# Patient Record
Sex: MALE
[De-identification: names, ages, dates, MRNs, and addresses within clinical notes are randomized per-mention and may not be internally consistent; named-entity substitution may affect disease eponyms.]

---

## 2018-04-06 ENCOUNTER — HOSPITAL ENCOUNTER (EMERGENCY)
Dept: HOSPITAL 68 - ERH | Age: 38
End: 2018-04-06
Payer: COMMERCIAL

## 2018-04-06 VITALS — DIASTOLIC BLOOD PRESSURE: 87 MMHG | SYSTOLIC BLOOD PRESSURE: 132 MMHG

## 2018-04-06 VITALS — WEIGHT: 180 LBS | BODY MASS INDEX: 29.99 KG/M2 | HEIGHT: 65 IN

## 2018-04-06 DIAGNOSIS — K04.7: Primary | ICD-10-CM

## 2018-04-06 NOTE — ED THROAT/DENTAL COMPLAINT
History of Present Illness
 
General
Chief Complaint: General Adult
Stated Complaint: "BUMP INSIDE TOP LIP/ROOF OF MY MOUTH"
Source: patient
Exam Limitations: no limitations
 
Vital Signs & Intake/Output
Vital Signs & Intake/Output
 Vital Signs
 
 
Date Time Temp Pulse Resp B/P B/P Pulse O2 O2 Flow FiO2
 
     Mean Ox Delivery Rate 
 
04/06 0747 96.6 80 16 132/87  98 Room Air  
 
 
 
Allergies
Coded Allergies:
No Known Allergies (04/06/18)
 
Reconcile Medications
Amoxicillin/Clavulanate Potass (Amox-Clav 875-125 MG Tablet) 875 MG-125 MG 
TABLET   1 TAB PO BID ANTIBIOTIC, INFECTION  (Reported)
Hydrocodone/Acetaminophen (Norco 5-325 Tablet) 5 MG-325 MG TABLET   1-2 TAB PO 
Q4-6 PRN PRN PAIN
Lactobacillus Acidophilus (Probiotic) 10 BILLION CELL CAPSULE   1 CAP PO DAILY 
GI  (Reported)
Lidocaine HCl (Lidocaine HCl Viscous) 2 % SOLUTION   15 ML PO 4 TIMES/DAY PRN 
DENTAL PAIN
 
Triage Note:
PT SEEN AT WALK IN CLINIC YESTERDAY AND PRESCRIBED
 AMOXCILLIN FOR ABCESS TO ROOF OF MOUTH AND UPPER
 LIP. TOLD TO COME TO ED IF PAIN WORSENED.
 ARRIVES TO ED FOR INCREASED PAIN/SWELLING TO
 ABCESS ON UPPER LIP. TOOK DOSE OF AMOXICILLIN THIS
 AM. AWAKE/ALERT WITH EASY WOB. DENIES DRAINAGE
 FROM EITHER ABCESS.
Triage Nurses Notes Reviewed? yes
Onset: Gradual
Duration: day(s): (2)
Timing: no prior history
Injury Environment: home
Severity: moderate
Severity Numbers: 6
Modifying Factors:
Worsens With: movement. 
HPI:
Patient is a 38-year-old male with no past medical history presenting to the 
emergency department with chief complaint of upper gum and upper lip since 
yesterday.  He was seen at a walk-in clinic and diagnosed with a gum abscess.  
Who started on Augmentin yesterday evening, patient has taken a total of 3 doses
including this morning.  Reports that the pain is not controlled with Motrin.  
He does report that the abscess has increased in size since yesterday as well.  
No fevers or chills.  Otherwise feeling fine.  No history of diabetes.  Denies 
any trauma.
 
Past History
 
Travel History
Traveled to [place] past 21 day No
 
Medical History
Any Pertinent Medical History? see below for history
Neurological: NONE
EENT: NONE
Cardiovascular: NONE
Respiratory: NONE
Gastrointestinal: NONE
Hepatic: NONE
Renal: NONE
Musculoskeletal: NONE
Psychiatric: NONE
Endocrine: NONE
 
Surgical History
Surgical History: non-contributory
 
Psychosocial History
What is your primary language English
Tobacco Use: Current Daily Use
Daily Tobacco Use Amount/Type: => 5 Cigarettes daily
Illicit Drug Use: denies illicit drug use
 
Family History
Hx Contributory? No
 
Review of Systems
 
Review of Systems
Constitutional:
Reports: no symptoms. 
Comments
Review of systems: See HPI, All other systems negative.
Constitutional, no chills fever or weight loss
HEENT: No visual changes no sore throat no congestion
Cardiovascular: No chest pain ,palpitation , orthopnea or ankle swelling
Skin, no jaundice 
Respiratory: No dyspnea cough sputum or hemoptysis
GI: No nausea no vomiting
: No dysuria No hematuria
Muscle skeletal: no back pain, no neck pain,
Neurologic: No numbness no confusion, no headaches
Psych: No stress anxiety 
Immunology: No splenectomy or history of AIDS
 
Physical Exam
 
Physical Exam
General Appearance: well developed/nourished, no apparent distress, alert, awake
, comfortable
Mouth/Throat: MILD ERYTHEMA AND EDEMA WITH FLUCTUANCE NOTED TO UPPER GUMLINE, 
CENTRALLY LOCATED.
Comments:
Well-developed well-nourished person in no acute distress
HEENT:  Pupils equally round and reactive to light and accommodation. Nose is 
atraumatic.  Mild edema noted in the upper gumline, centrally located with 
fluctuance approximately 1 cm in size, tender.  No discharge noted.  Upper lip 
is mildly edematous, no abscess identified on the lip.  Clearing secretions 
without difficulty.  No airway edema noted.
Neck: Normal inspection
Respiratory: No respiratory distress.
Extremity: No edema
Neuro: Alert oriented x3, motor sensory normal
Skin: No appreciable rash on exposed skin, skin is warm and dry.
Psych: Mood and affect is normal, memory and judgment is normal.
 
Core Measures
ACS in differential dx? No
Sepsis Present: No
Sepsis Focused Exam Completed? No
 
Progress
Differential Diagnosis: , ABSCESS, PERITONSILLAR ABSCESS, CELLULITIS, DENTAL 
INFECTION
Plan of Care:
 Current Medications
 
 
  Sig/Sharonda Start time  Last
 
Medication Dose  Stop Time Status Admin
 
Lidocaine 20 ML ONCE ONE 04/06 0815 UNVr 04/06
 
(Lidocaine 1%)   04/06 0816 0817
 
 
 
 
Departure
 
Departure
Time of Disposition: 0819
Disposition: HOME OR SELF CARE
Condition: Stable
Clinical Impression
Primary Impression: Dental abscess
Referrals:
Taylor Fatima MD (PCP/Family)
 
Additional Instructions:
Follow-up with your oral surgeon, call today to make an appointment for the next
3-5 days.  Continue Augmentin as per dosage tried.  He can take ibuprofen as 
directed over-the-counter.  For severe pain take Norco as prescribed.  Return 
for worsening symptoms or concerns.  It is also advised that he follow-up with 
the Hospital the oral surgeon either to is associated with for any worsening 
symptoms.
Departure Forms:
Customer Survey
General Discharge Information
Prescriptions:
Current Visit Scripts
Lidocaine HCl (Lidocaine HCl Viscous) 15 ML PO 4 TIMES/DAY PRN DENTAL PAIN 
     #100 ML 
 
Hydrocodone/Acetaminophen (Norco 5-325 Tablet) 1-2 TAB PO Q4-6 PRN PRN PAIN 
     #10 TAB 
 
 
 
Procedures
 
Incision and Drainage
Site: central upper gum
Blade Size: 23 gauge needles aspiration
I & D  Procedure:
No: betadine prep, wick placed. 
Progress:
Area was prepped with hydrogen peroxide.  Using a 23-gauge needle approximately 
1 mL was aspirated, fluid was cloudy and sanguinous.  Patient tolerated 
procedure well.